# Patient Record
Sex: MALE | Race: OTHER | NOT HISPANIC OR LATINO | URBAN - METROPOLITAN AREA
[De-identification: names, ages, dates, MRNs, and addresses within clinical notes are randomized per-mention and may not be internally consistent; named-entity substitution may affect disease eponyms.]

---

## 2018-04-07 ENCOUNTER — EMERGENCY (EMERGENCY)
Facility: HOSPITAL | Age: 28
LOS: 1 days | Discharge: HOME | End: 2018-04-07
Attending: EMERGENCY MEDICINE
Payer: COMMERCIAL

## 2018-04-07 VITALS
HEIGHT: 70 IN | WEIGHT: 134.92 LBS | SYSTOLIC BLOOD PRESSURE: 134 MMHG | HEART RATE: 88 BPM | DIASTOLIC BLOOD PRESSURE: 69 MMHG | RESPIRATION RATE: 16 BRPM | OXYGEN SATURATION: 99 % | TEMPERATURE: 99 F

## 2018-04-07 LAB
ALBUMIN SERPL ELPH-MCNC: 4.7 G/DL — SIGNIFICANT CHANGE UP (ref 3.5–5)
ALP SERPL-CCNC: 65 U/L — SIGNIFICANT CHANGE UP (ref 40–120)
ALT FLD-CCNC: 23 U/L DA — SIGNIFICANT CHANGE UP (ref 10–60)
ANION GAP SERPL CALC-SCNC: 9 MMOL/L — SIGNIFICANT CHANGE UP (ref 5–17)
APPEARANCE UR: CLEAR — SIGNIFICANT CHANGE UP
APPEARANCE UR: CLEAR — SIGNIFICANT CHANGE UP
APTT BLD: 33 SEC — SIGNIFICANT CHANGE UP (ref 27.5–37.4)
AST SERPL-CCNC: 46 U/L — HIGH (ref 10–40)
BASOPHILS # BLD AUTO: 0 K/UL — SIGNIFICANT CHANGE UP (ref 0–0.2)
BASOPHILS NFR BLD AUTO: 0.4 % — SIGNIFICANT CHANGE UP (ref 0–2)
BILIRUB SERPL-MCNC: 0.7 MG/DL — SIGNIFICANT CHANGE UP (ref 0.2–1.2)
BILIRUB UR-MCNC: NEGATIVE — SIGNIFICANT CHANGE UP
BILIRUB UR-MCNC: NEGATIVE — SIGNIFICANT CHANGE UP
BUN SERPL-MCNC: 17 MG/DL — SIGNIFICANT CHANGE UP (ref 7–18)
CALCIUM SERPL-MCNC: 8.9 MG/DL — SIGNIFICANT CHANGE UP (ref 8.4–10.5)
CHLORIDE SERPL-SCNC: 105 MMOL/L — SIGNIFICANT CHANGE UP (ref 96–108)
CO2 SERPL-SCNC: 22 MMOL/L — SIGNIFICANT CHANGE UP (ref 22–31)
COLOR SPEC: YELLOW — SIGNIFICANT CHANGE UP
COLOR SPEC: YELLOW — SIGNIFICANT CHANGE UP
CREAT SERPL-MCNC: 0.95 MG/DL — SIGNIFICANT CHANGE UP (ref 0.5–1.3)
DIFF PNL FLD: ABNORMAL
DIFF PNL FLD: ABNORMAL
EOSINOPHIL # BLD AUTO: 0 K/UL — SIGNIFICANT CHANGE UP (ref 0–0.5)
EOSINOPHIL NFR BLD AUTO: 0.1 % — SIGNIFICANT CHANGE UP (ref 0–6)
GLUCOSE SERPL-MCNC: 105 MG/DL — HIGH (ref 70–99)
GLUCOSE UR QL: NEGATIVE — SIGNIFICANT CHANGE UP
GLUCOSE UR QL: NEGATIVE — SIGNIFICANT CHANGE UP
HCG UR QL: NEGATIVE — SIGNIFICANT CHANGE UP
HCG UR QL: NEGATIVE — SIGNIFICANT CHANGE UP
HCT VFR BLD CALC: 50.4 % — HIGH (ref 39–50)
HGB BLD-MCNC: 16 G/DL — SIGNIFICANT CHANGE UP (ref 13–17)
INR BLD: 1.1 RATIO — SIGNIFICANT CHANGE UP (ref 0.88–1.16)
KETONES UR-MCNC: ABNORMAL
KETONES UR-MCNC: ABNORMAL
LEUKOCYTE ESTERASE UR-ACNC: NEGATIVE — SIGNIFICANT CHANGE UP
LEUKOCYTE ESTERASE UR-ACNC: NEGATIVE — SIGNIFICANT CHANGE UP
LYMPHOCYTES # BLD AUTO: 0.6 K/UL — LOW (ref 1–3.3)
LYMPHOCYTES # BLD AUTO: 6.2 % — LOW (ref 13–44)
MCHC RBC-ENTMCNC: 28.7 PG — SIGNIFICANT CHANGE UP (ref 27–34)
MCHC RBC-ENTMCNC: 31.8 GM/DL — LOW (ref 32–36)
MCV RBC AUTO: 90.4 FL — SIGNIFICANT CHANGE UP (ref 80–100)
MONOCYTES # BLD AUTO: 0.2 K/UL — SIGNIFICANT CHANGE UP (ref 0–0.9)
MONOCYTES NFR BLD AUTO: 2.3 % — SIGNIFICANT CHANGE UP (ref 2–14)
NEUTROPHILS # BLD AUTO: 8.1 K/UL — HIGH (ref 1.8–7.4)
NEUTROPHILS NFR BLD AUTO: 91 % — HIGH (ref 43–77)
NITRITE UR-MCNC: NEGATIVE — SIGNIFICANT CHANGE UP
NITRITE UR-MCNC: NEGATIVE — SIGNIFICANT CHANGE UP
PH UR: 6 — SIGNIFICANT CHANGE UP (ref 5–8)
PH UR: 6 — SIGNIFICANT CHANGE UP (ref 5–8)
PLATELET # BLD AUTO: 240 K/UL — SIGNIFICANT CHANGE UP (ref 150–400)
POTASSIUM SERPL-MCNC: 5.2 MMOL/L — SIGNIFICANT CHANGE UP (ref 3.5–5.3)
POTASSIUM SERPL-SCNC: 5.2 MMOL/L — SIGNIFICANT CHANGE UP (ref 3.5–5.3)
PROT SERPL-MCNC: 8.5 G/DL — HIGH (ref 6–8.3)
PROT UR-MCNC: NEGATIVE — SIGNIFICANT CHANGE UP
PROT UR-MCNC: NEGATIVE — SIGNIFICANT CHANGE UP
PROTHROM AB SERPL-ACNC: 12 SEC — SIGNIFICANT CHANGE UP (ref 9.8–12.7)
RBC # BLD: 5.58 M/UL — SIGNIFICANT CHANGE UP (ref 4.2–5.8)
RBC # FLD: 12.3 % — SIGNIFICANT CHANGE UP (ref 10.3–14.5)
SODIUM SERPL-SCNC: 136 MMOL/L — SIGNIFICANT CHANGE UP (ref 135–145)
SP GR SPEC: 1.01 — SIGNIFICANT CHANGE UP (ref 1.01–1.02)
SP GR SPEC: 1.01 — SIGNIFICANT CHANGE UP (ref 1.01–1.02)
UROBILINOGEN FLD QL: NEGATIVE — SIGNIFICANT CHANGE UP
UROBILINOGEN FLD QL: NEGATIVE — SIGNIFICANT CHANGE UP
WBC # BLD: 8.8 K/UL — SIGNIFICANT CHANGE UP (ref 3.8–10.5)
WBC # FLD AUTO: 8.8 K/UL — SIGNIFICANT CHANGE UP (ref 3.8–10.5)

## 2018-04-07 PROCEDURE — 74176 CT ABD & PELVIS W/O CONTRAST: CPT | Mod: 26

## 2018-04-07 PROCEDURE — 85730 THROMBOPLASTIN TIME PARTIAL: CPT

## 2018-04-07 PROCEDURE — 99284 EMERGENCY DEPT VISIT MOD MDM: CPT | Mod: 25

## 2018-04-07 PROCEDURE — 99285 EMERGENCY DEPT VISIT HI MDM: CPT

## 2018-04-07 PROCEDURE — 51703 INSERT BLADDER CATH COMPLEX: CPT

## 2018-04-07 PROCEDURE — 86850 RBC ANTIBODY SCREEN: CPT

## 2018-04-07 PROCEDURE — 74176 CT ABD & PELVIS W/O CONTRAST: CPT

## 2018-04-07 PROCEDURE — 51702 INSERT TEMP BLADDER CATH: CPT

## 2018-04-07 PROCEDURE — 85610 PROTHROMBIN TIME: CPT

## 2018-04-07 PROCEDURE — 80053 COMPREHEN METABOLIC PANEL: CPT

## 2018-04-07 PROCEDURE — 86900 BLOOD TYPING SEROLOGIC ABO: CPT

## 2018-04-07 PROCEDURE — 81001 URINALYSIS AUTO W/SCOPE: CPT

## 2018-04-07 PROCEDURE — 99285 EMERGENCY DEPT VISIT HI MDM: CPT | Mod: 25

## 2018-04-07 PROCEDURE — 86901 BLOOD TYPING SEROLOGIC RH(D): CPT

## 2018-04-07 PROCEDURE — 81025 URINE PREGNANCY TEST: CPT

## 2018-04-07 PROCEDURE — 85027 COMPLETE CBC AUTOMATED: CPT

## 2018-04-07 NOTE — PROCEDURE NOTE - PROCEDURE
<<-----Click on this checkbox to enter Procedure Best catheter to gravity drainage  04/07/2018    Active  CHARTMAN

## 2018-04-07 NOTE — PROCEDURE NOTE - NSPROCDETAILS_GEN_ALL_CORE
a urinary catheter insertion kit was used for all insertion materials/prior to placement, an active physician order for the placement of a urinary catheter was verified/sterile technique, indwelling urinary device inserted

## 2018-04-07 NOTE — CONSULT NOTE ADULT - SUBJECTIVE AND OBJECTIVE BOX
Very pleasant 28 year old gentleman who presented to ED with complaints of urinary retention, urinary frequency, weak urinary stream, low volume voids, microscopic hematuria.  He reports that he was recently in [place] and was told that he had blood in his urine.  Last night he began to experience a slow urinary stream, feeling of incomplete bladder emptying, abdominal pain and distention.  He presented to the ED for these complaints, and reports that he had urinated ~15 times since this morning. No fevers or chills. No flank pain. Reports that the same symptoms occurred a few months ago but resolved spontaneously. No aggravating or alleviating factors. No specific timing to his symptoms. Pain does not radiate    PMH: None  PSH: None  FH: No family history of  malignancy  SH: Does not smoke  Meds: None  Allergies: NKDA    ROS: All others negative except as noted above    Vital Signs Last 24 Hrs  T(C): 37 (07 Apr 2018 05:59), Max: 37 (07 Apr 2018 05:59)  T(F): 98.6 (07 Apr 2018 05:59), Max: 98.6 (07 Apr 2018 05:59)  HR: 88 (07 Apr 2018 05:59) (88 - 88)  BP: 134/69 (07 Apr 2018 05:59) (134/69 - 134/69)  BP(mean): --  RR: 16 (07 Apr 2018 05:59) (16 - 16)  SpO2: 99% (07 Apr 2018 05:59) (99% - 99%)                          16.0   8.8   )-----------( 240      ( 07 Apr 2018 11:27 )             50.4     04-07    136  |  105  |  17  ----------------------------<  105<H>  5.2   |  22  |  0.95    Ca    8.9      07 Apr 2018 11:27    TPro  8.5<H>  /  Alb  4.7  /  TBili  0.7  /  DBili  x   /  AST  46<H>  /  ALT  23  /  AlkPhos  65  04-07    EXAM:  CT ABDOMEN AND PELVIS                            PROCEDURE DATE:  04/07/2018          INTERPRETATION:  CLINICAL INFORMATION: Bladder distention and urinary   retention.    CT of the abdomen and pelvis was performed without intravenous contrast.   Sagittal and coronal reconstructions were obtained.     Comparison: None.    Lower chest: The visualized lower lungs are unremarkable.    Abdomen:   Liver: normal.  Spleen: normal.  Pancreas: normal.  Adrenal glands: normal.  Gall bladder: normal.  Kidneys: 3 mm nonobstructing left upper pole calcification.    Lymph nodes: There is no retroperitoneal or abdominal lymphadenopathy.  Aorta and branch vessels: Unremarkable.  Inferior vena cava: Unremarkable.    Bowel: The large and small bowel are unremarkable. Normal appendix.    There is no free air of free fluid.    Pelvis:  Bladder: Distended. Normal..  Reproductive organs: At the level of the prostatic urethra there is a   cystic structure measuring approximately 2.9 x 3.1 x 3.7 cm. This   contains numerous small calcifications measuring up to 4 to 5 mm. This   may represent a diverticulum of the prostatic urethra with small stones.   The differential also includes a prostate abscess. Evaluation is severely   limited on this noncontrast examination. Urology consultation is   recommended.  Lymphnodes: No pathologically enlarged lymphnodes.     Osseous structures:  The osseous structures show scattered degenerative changes.    IMPRESSION:    At the level of the prostatic urethra there is a cystic structure   measuring approximately 2.9 x 3.1 x 3.7 cm. This contains numerous small   calcifications measuring up to 4 to 5 mm. This may represent a   diverticulum of the prostatic urethra with small stones. The differential   also includes a prostate abscess. Evaluation is severely limited on this   noncontrast examination. Urology consultation is recommended.    3 mm nonobstructing left renal stone. No hydronephrosis or hydroureter.                MITCHELL HENDRICKS M.D., ATTENDINGRADIOLOGIST  This document has been electronically signed. Apr 7 2018 10:43AM

## 2018-04-07 NOTE — ED ADULT NURSE NOTE - OBJECTIVE STATEMENT
Patient presents to Ed with c/o inability to urinate since last night 8:30. patient stated the last time he urinated was at 8:30 last night and since then he has the urge but non much comes out and its painful burning sensation

## 2018-04-07 NOTE — PROCEDURE NOTE - ADDITIONAL PROCEDURE DETAILS
Resistance met at prostatic urethra.  Catheter was passed with difficulty into bladder with immediate return of clear yellow urine. ~400 cc urine drained into collection bag.

## 2018-04-07 NOTE — CONSULT NOTE ADULT - CONSULT REASON
urinary retention, suprapubic pain, prostatic urethral diverticulum, microscopic hematuria, weak urinary stream, feeling of incomplete bladder emptying, urinary frequency

## 2018-04-07 NOTE — PROCEDURE NOTE - NSURITECHNIQUE_GU_A_CORE
The site was cleaned with soap/water and sterile solution (betadine)/The urinary drainage system is closed at the end of the procedure/The catheter was secured with a securement device (e.g. StatLock)/The collection bag is below the level of the patient and urinary bladder/All applicable medical record documentation is completed/Proper hand hygiene was performed/Sterile gloves were worn for the duration of the procedure/A sterile drape was used to cover all adjacent areas/The catheter was appropriately lubricated

## 2018-04-07 NOTE — CONSULT NOTE ADULT - ASSESSMENT
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 Torres after discharge by calling [phone] or [phone] to schedule an appointment. Follow up on Tuesday, 4/10/2018  [address] [facility]  [place] [address]  Card given to patient.

## 2018-04-07 NOTE — ED PROVIDER NOTE - OBJECTIVE STATEMENT
27 y/o male with no significant PMHx presents to the ED c/o lower abd distention x several days. Pt notes associated Sx of dysuria and urgency. Pt denies N/V/D, fever, back pain, or any other complaints. Pt notes he was in [place] a few months ago and had routine labs done, showed blood in the urine but not further investigation was ever done. NKDA.

## 2018-04-07 NOTE — ED PROVIDER NOTE - MEDICAL DECISION MAKING DETAILS
29 y/o pt with bladder distention. UA unremarkable. Will do CT scan and reassess. 29 y/o pt with bladder distention. UA unremarkable. CT reveals urethral diverticulum. patient evaluation by Dr Torres, willson placed by him. home with leg back and outpatient followup

## 2018-04-09 PROBLEM — Z00.00 ENCOUNTER FOR PREVENTIVE HEALTH EXAMINATION: Status: ACTIVE | Noted: 2018-04-09

## 2018-04-10 ENCOUNTER — APPOINTMENT (OUTPATIENT)
Dept: UROLOGY | Facility: CLINIC | Age: 28
End: 2018-04-10
Payer: COMMERCIAL

## 2018-04-10 VITALS
SYSTOLIC BLOOD PRESSURE: 128 MMHG | BODY MASS INDEX: 19.99 KG/M2 | RESPIRATION RATE: 16 BRPM | DIASTOLIC BLOOD PRESSURE: 72 MMHG | TEMPERATURE: 98.5 F | WEIGHT: 135 LBS | HEART RATE: 91 BPM | OXYGEN SATURATION: 98 % | HEIGHT: 69 IN

## 2018-04-10 PROCEDURE — 52000 CYSTOURETHROSCOPY: CPT

## 2018-04-27 ENCOUNTER — APPOINTMENT (OUTPATIENT)
Dept: MRI IMAGING | Facility: CLINIC | Age: 28
End: 2018-04-27
Payer: COMMERCIAL

## 2018-04-27 ENCOUNTER — OUTPATIENT (OUTPATIENT)
Dept: OUTPATIENT SERVICES | Facility: HOSPITAL | Age: 28
LOS: 1 days | End: 2018-04-27
Payer: COMMERCIAL

## 2018-04-27 DIAGNOSIS — N36.1 URETHRAL DIVERTICULUM: ICD-10-CM

## 2018-04-27 PROCEDURE — 74183 MRI ABD W/O CNTR FLWD CNTR: CPT | Mod: 26

## 2018-04-27 PROCEDURE — 72197 MRI PELVIS W/O & W/DYE: CPT | Mod: 26

## 2018-04-27 PROCEDURE — 72197 MRI PELVIS W/O & W/DYE: CPT

## 2018-04-27 PROCEDURE — 74183 MRI ABD W/O CNTR FLWD CNTR: CPT

## 2018-04-27 PROCEDURE — A9585: CPT

## 2018-04-29 ENCOUNTER — EMERGENCY (EMERGENCY)
Facility: HOSPITAL | Age: 28
LOS: 1 days | Discharge: HOME | End: 2018-04-29
Attending: EMERGENCY MEDICINE
Payer: COMMERCIAL

## 2018-04-29 VITALS
TEMPERATURE: 99 F | RESPIRATION RATE: 16 BRPM | OXYGEN SATURATION: 100 % | SYSTOLIC BLOOD PRESSURE: 127 MMHG | DIASTOLIC BLOOD PRESSURE: 79 MMHG | HEART RATE: 86 BPM

## 2018-04-29 PROCEDURE — 99283 EMERGENCY DEPT VISIT LOW MDM: CPT | Mod: 25

## 2018-04-29 PROCEDURE — 87086 URINE CULTURE/COLONY COUNT: CPT

## 2018-04-29 PROCEDURE — 51702 INSERT TEMP BLADDER CATH: CPT

## 2018-04-29 NOTE — ED PROVIDER NOTE - OBJECTIVE STATEMENT
29 y/o M pt presents to ED c/o urinary retention since this morning. Pt reports he was seen here for similar 3 weeks ago when he had a CT done that showed urethral diverticulum. Pt states he had immediate relief when he was put on a Best, had the Best on for 2 days and was then able to urinate until today. Pt follows-up with Dr. Torres and notes he "let his bladder become too full before he could urinate" today. Pt otherwise denies fever, chills, or any other complaints. NKDA.

## 2018-04-29 NOTE — ED ADULT NURSE NOTE - OBJECTIVE STATEMENT
29 y/o M pt presents to ED c/o urinary retention since this morning. Pt reports he was seen here for similar 3 weeks ago when he had a CT done that showed urethral diverticulum. Pt states he had immediate relief when he was put on a Best, had the Best on for 2 days and was then able to urinate until today. Pt follows-up with Dr. Torres and notes he "let his bladder become too full before he could urinate" today. Pt otherwise denies fever, chills, or any other complaints.

## 2018-04-29 NOTE — ED PROVIDER NOTE - PROGRESS NOTE DETAILS
Pt does not want to go home with the willson, he is requesting that it be removed. He feels that he went into retention because he waited too long to urinate and that the pressure in his bladder affected his prostate. I told him that I recommend keeping the willson in place. Risks, benefits, alternatives and hazards reviewed, pt voices understanding.

## 2018-04-30 LAB
CULTURE RESULTS: SIGNIFICANT CHANGE UP
SPECIMEN SOURCE: SIGNIFICANT CHANGE UP

## 2018-05-02 RX ORDER — AZTREONAM 2 G
1 VIAL (EA) INJECTION
Qty: 14 | Refills: 0 | OUTPATIENT
Start: 2018-05-02 | End: 2018-05-08

## 2018-05-02 NOTE — ED POST DISCHARGE NOTE - ADDITIONAL DOCUMENTATION
Patient has no fever, back pain, abd pain. Has appt with Dr. Torres on 5/5. Return to the ED immediately if getting worse, not improving, or if having any new or troubling symptoms.

## 2018-05-04 ENCOUNTER — APPOINTMENT (OUTPATIENT)
Dept: UROLOGY | Facility: CLINIC | Age: 28
End: 2018-05-04
Payer: COMMERCIAL

## 2018-05-04 VITALS
OXYGEN SATURATION: 98 % | DIASTOLIC BLOOD PRESSURE: 82 MMHG | RESPIRATION RATE: 18 BRPM | HEART RATE: 100 BPM | SYSTOLIC BLOOD PRESSURE: 118 MMHG

## 2018-05-04 DIAGNOSIS — R33.9 RETENTION OF URINE, UNSPECIFIED: ICD-10-CM

## 2018-05-04 DIAGNOSIS — Z78.9 OTHER SPECIFIED HEALTH STATUS: ICD-10-CM

## 2018-05-04 DIAGNOSIS — N36.1 URETHRAL DIVERTICULUM: ICD-10-CM

## 2018-05-04 PROCEDURE — 99214 OFFICE O/P EST MOD 30 MIN: CPT

## 2021-12-06 NOTE — ED ADULT TRIAGE NOTE - NS ED NURSE BANDS TYPE
Phone call to wife Nely [phone] per Dr. Manrique Patient doing well getting close to being done.   Name band;